# Patient Record
Sex: MALE | Race: WHITE | NOT HISPANIC OR LATINO | Employment: UNEMPLOYED | URBAN - METROPOLITAN AREA
[De-identification: names, ages, dates, MRNs, and addresses within clinical notes are randomized per-mention and may not be internally consistent; named-entity substitution may affect disease eponyms.]

---
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
## 2025-06-06 ENCOUNTER — TELEPHONE (OUTPATIENT)
Age: 20
End: 2025-06-06

## 2025-06-06 NOTE — TELEPHONE ENCOUNTER
New Patient      Insurance   Current Insurance?[employer]    Insurance E-verified? Yes    History   Reason for appointment/active symptoms?  NP Small Lump Under Testicles      Has the patient had any previous Urologist(s)? No     Was the patient seen in the ED? No     Labs/Imaging(Including Out Of Network)? No     Records Requested? Yes  Records Visible in EPIC? Yes     Personal history of cancer? No     Appointment   Office location preference:  [place]  ?   Appointment Details   Date:  6/30  Time:  1:40  Location:  [place]   Provider:  Jame  Does the appointment need further review? No

## 2025-06-25 RX ORDER — MELOXICAM 15 MG/1
15 TABLET ORAL DAILY
COMMUNITY
Start: 2025-04-09

## 2025-06-25 RX ORDER — DEXTROAMPHETAMINE SACCHARATE, AMPHETAMINE ASPARTATE, DEXTROAMPHETAMINE SULFATE AND AMPHETAMINE SULFATE 1.25; 1.25; 1.25; 1.25 MG/1; MG/1; MG/1; MG/1
5 TABLET ORAL DAILY PRN
COMMUNITY
Start: 2025-03-24

## 2025-06-30 ENCOUNTER — OFFICE VISIT (OUTPATIENT)
Dept: UROLOGY | Facility: AMBULATORY SURGERY CENTER | Age: 20
End: 2025-06-30
Payer: COMMERCIAL

## 2025-06-30 VITALS
DIASTOLIC BLOOD PRESSURE: 64 MMHG | WEIGHT: 250 LBS | HEIGHT: 74 IN | OXYGEN SATURATION: 97 % | BODY MASS INDEX: 32.08 KG/M2 | SYSTOLIC BLOOD PRESSURE: 134 MMHG | HEART RATE: 65 BPM

## 2025-06-30 DIAGNOSIS — R19.09 GROIN LUMP: Primary | ICD-10-CM

## 2025-06-30 PROCEDURE — 99204 OFFICE O/P NEW MOD 45 MIN: CPT

## 2025-06-30 RX ORDER — MULTIVIT-MIN/IRON FUM/FOLIC AC 7.5 MG-4
1 TABLET ORAL DAILY
COMMUNITY

## 2025-06-30 NOTE — ASSESSMENT & PLAN NOTE
Examination of the area in question today did reveal a soft, mobile lump within the left inner thigh region.  We discussed that my suspicion may be for a hernia.  However, differential diagnosis also includes potential lymph node, lipoma, or sebaceous cyst.  Fortunately, I reassured the patient and his mother that I do not concern for a cancerous process at this time.  Recommended obtainment of an ultrasound of the area for initial evaluation.  If the ultrasound returns inconclusive then we may need to pursue a CT scan of the pelvis/groin region for further evaluation.  Patient will follow-up as needed and our office will call with ultrasound results and based follow-up off of the results from the imaging.

## 2025-06-30 NOTE — PROGRESS NOTES
6/30/2025      Assessment and Plan    19 y.o. male new patient to [facility] for urology    Groin lump  Examination of the area in question today did reveal a soft, mobile lump within the left inner thigh region.  We discussed that my suspicion may be for a hernia.  However, differential diagnosis also includes potential lymph node, lipoma, or sebaceous cyst.  Fortunately, I reassured the patient and his mother that I do not concern for a cancerous process at this time.  Recommended obtainment of an ultrasound of the area for initial evaluation.  If the ultrasound returns inconclusive then we may need to pursue a CT scan of the pelvis/groin region for further evaluation.  Patient will follow-up as needed and our office will call with ultrasound results and based follow-up off of the results from the imaging.        History of Present Illness  Ricky Marte is a 19 y.o. male here for evaluation of groin lump.    Today, the patient presents the office with his mother.  Patient and his mother endorse that they were recently on a 2-week trip to [place] and during that time the patient was feeling within his groin region and felt a lump in his left inner thigh region.  Patient notes that he tried to apply hard pressure to the area and felt as if it may have popped at 1 point.  Patient denies any pain since realizing that it was present.  Patient notes that it may have increased in size on 1 occasion, but is not entirely sure.  Patient denies any infectious symptoms including fevers, chills, nausea, or vomiting.  Patient notes that he is physically active and a .  Otherwise, the patient offers no testicular complaints or changes in his voiding pattern.        Review of Systems   Constitutional:  Negative for chills and fever.   HENT:  Negative for ear pain and sore throat.    Eyes:  Negative for pain and visual disturbance.   Respiratory:  Negative for cough and shortness of breath.    Cardiovascular:   "Negative for chest pain and palpitations.   Gastrointestinal:  Negative for abdominal pain and vomiting.   Genitourinary:  Negative for decreased urine volume, difficulty urinating, dysuria, flank pain, frequency, hematuria and urgency.   Musculoskeletal:  Negative for arthralgias and back pain.   Skin:  Negative for color change and rash.   Neurological:  Negative for seizures and syncope.   All other systems reviewed and are negative.          AUA SYMPTOM SCORE      Flowsheet Row Most Recent Value   AUA SYMPTOM SCORE    How often have you had a sensation of not emptying your bladder completely after you finished urinating? 0 (P)     How often have you had to urinate again less than two hours after you finished urinating? 0 (P)     How often have you found you stopped and started again several times when you urinate? 0 (P)     How often have you found it difficult to postpone urination? 0 (P)     How often have you had a weak urinary stream? 0 (P)     How often have you had to push or strain to begin urination? 0 (P)     How many times did you most typically get up to urinate from the time you went to bed at night until the time you got up in the morning? 0 (P)     Quality of Life: If you were to spend the rest of your life with your urinary condition just the way it is now, how would you feel about that? 0 (P)     AUA SYMPTOM SCORE 0 (P)               Vitals  Vitals:    06/30/25 1346   BP: 134/64   BP Location: Left arm   Patient Position: Sitting   Cuff Size: Standard   Pulse: 65   SpO2: 97%   Weight: 113 kg (250 lb)   Height: 6' 2\" (1.88 m)       Physical Exam  Vitals reviewed.   Constitutional:       General: He is not in acute distress.     Appearance: Normal appearance. He is not ill-appearing.   HENT:      Head: Normocephalic and atraumatic.      Nose: Nose normal.     Eyes:      General: No scleral icterus.    Pulmonary:      Effort: No respiratory distress.   Abdominal:      General: Abdomen is flat. There " "is no distension.      Palpations: Abdomen is soft.      Tenderness: There is no abdominal tenderness.   Genitourinary:     Comments: Soft, mobile 1 to 2 cm palpable lump beneath the skin in the left inner thigh region.    Musculoskeletal:         General: Normal range of motion.      Cervical back: Normal range of motion.     Skin:     General: Skin is warm.      Coloration: Skin is not jaundiced.     Neurological:      Mental Status: He is alert and oriented to person, place, and time.      Gait: Gait normal.     Psychiatric:         Mood and Affect: Mood normal.         Behavior: Behavior normal.           Past History  Past Medical History[1]  Social History[2]  Tobacco Use History[3]  Family History[4]    The following portions of the patient's history were reviewed and updated as appropriate: allergies, current medications, past medical history, past social history, past surgical history and problem list.    Results  No results found for this or any previous visit (from the past hour).]  No results found for: \"PSA\"  Lab Results   Component Value Date    CALCIUM 9.2 09/27/2019    K 4.7 09/27/2019    CO2 27 09/27/2019     09/27/2019    BUN 11 09/27/2019    CREATININE 0.76 09/27/2019     No results found for: \"WBC\", \"HGB\", \"HCT\", \"MCV\", \"PLT\"        [1]   Past Medical History:  Diagnosis Date    ADHD (attention deficit hyperactivity disorder)    [2]   Social History  Socioeconomic History    Marital status: Single   Tobacco Use    Smoking status: Never     Passive exposure: Never    Smokeless tobacco: Former   Substance and Sexual Activity    Drug use: Yes     Types: Marijuana    Sexual activity: Not Currently     Social Drivers of Health     Financial Resource Strain: Not At Risk (3/24/2025)    Received from [facility]    Financial Insecurity     In the last 12 months did you skip medications to save money?: No     In the last 12 months was there a time when you needed to see a doctor but " could not because of cost?: No   Food Insecurity: No Food Insecurity (3/24/2025)    Received from [facility]    Food Insecurity     In the last 12 months did you ever eat less than you felt you should because there wasn't enough money for food?: No   Transportation Needs: No Transportation Needs (3/24/2025)    Received from [facility]    Transportation Needs     In the last 12 months have you ever had to go without healthcare because you didn't have a way to get there?: No   Social Connections: Socially Integrated (3/24/2025)    Received from [facility]    Social Connection     Do you often feel lonely?: No   Housing Stability: Not At Risk (3/24/2025)    Received from [facility]    Housing Stability     Are you worried that in the next 2 months you may not have stable housing?: No   [3]   Social History  Tobacco Use   Smoking Status Never    Passive exposure: Never   Smokeless Tobacco Former   [4] No family history on file.

## 2025-07-09 ENCOUNTER — HOSPITAL ENCOUNTER (OUTPATIENT)
Dept: ULTRASOUND IMAGING | Facility: MEDICAL CENTER | Age: 20
Discharge: HOME | End: 2025-07-09
Payer: COMMERCIAL

## 2025-07-09 DIAGNOSIS — R19.09 GROIN LUMP: ICD-10-CM

## 2025-07-09 PROCEDURE — 76882 US LMTD JT/FCL EVL NVASC XTR: CPT

## 2025-07-11 ENCOUNTER — PATIENT MESSAGE (OUTPATIENT)
Dept: UROLOGY | Facility: AMBULATORY SURGERY CENTER | Age: 20
End: 2025-07-11

## 2025-07-16 ENCOUNTER — RESULTS FOLLOW-UP (OUTPATIENT)
Dept: UROLOGY | Facility: AMBULATORY SURGERY CENTER | Age: 20
End: 2025-07-16

## 2025-07-16 DIAGNOSIS — R19.09 GROIN LUMP: Primary | ICD-10-CM

## 2025-07-16 DIAGNOSIS — R59.9 ENLARGED LYMPH NODE: ICD-10-CM

## 2025-07-17 NOTE — TELEPHONE ENCOUNTER
Called and left DVM going over information from note.Left brent back number if patient calls back please relay information.     ----- Message from Jame Dawkins PA-C sent at 7/16/2025  2:27 PM EDT -----  Please call the patient and advise him I reviewed his most recent ultrasound.  Ultrasound revealed a left groin lymph node with no suspicious features.  Would recommend follow-up with interventional   radiology to discuss potential biopsy.  Ambulatory referral to interventional radiology placed at this time.  ----- Message -----  From: Interface, Radiology Results In  Sent: 7/16/2025   1:27 PM EDT  To: Jame Dawkins PA-C

## 2025-07-18 NOTE — TELEPHONE ENCOUNTER
Called and spoke to patient and relayed information. Patient aware of Interventional Radiology referral and that they may be reaching out to schedule with him. Patient amenable to plan.        ----- Message from Jame Dawkins PA-C sent at 7/16/2025  2:27 PM EDT -----  Please call the patient and advise him I reviewed his most recent ultrasound.  Ultrasound revealed a left groin lymph node with no suspicious features.  Would recommend follow-up with interventional   radiology to discuss potential biopsy.  Ambulatory referral to interventional radiology placed at this time.  ----- Message -----  From: Interface, Radiology Results In  Sent: 7/16/2025   1:27 PM EDT  To: Jame Dawkins PA-C

## 2025-07-18 NOTE — TELEPHONE ENCOUNTER
"Called and spoke to mom who had questions about previous phone call states son had woken up to call and was very sleepy and was very confused what was being told on him.  . Explained I understand it was early in the morning and son may have not been attentive to whole conversation .Went over how I explained  although no suspicious features potential biopsy can be used to see what lymph node is composed of and sometimes can be used out to rule out cancer but that again lymph node \"has no suspicious features\".    She has question about Ultrasound  saying \"everyone has has lymph nodes\" and says on bottom of report it states \"Comparison image of the right medial thigh/groin shows no abnormalities\" so why is he being referred to interventional radiology for surgery before proceeding with more labs. Explained referral would be to discuss \"potential\" biopsy with interventional radiology. Patients mom wants more elaboration on Ultrasound . Please advise.        ----- Message from Marilia Gibbons sent at 7/18/2025  3:34 PM EDT -----      "

## 2025-07-18 NOTE — TELEPHONE ENCOUNTER
Pt's mom would like a cb to discuss the patient's US a bit better, the mom and the pt are a bit confused. Mom states that the term 'Cancer' was brought up in the last phone call and she isn't sure why. Kindly reach back out to the pt and mother for clarification.    Pt cb: [phone] Alain[place]

## 2025-07-23 NOTE — TELEPHONE ENCOUNTER
Called and left a detailed message relaying this message. I also left the number for urology if they have any questions. If this pt or his mother calls back please relay these messages again. Thank you              ----- Message from Jame Dawkins PA-C sent at 7/23/2025  9:19 AM EDT -----  There is no suspicious features to the left groin lymph node.  This is not a referral for surgery, but however a consultation in regards to the palpable lymph node and if there is clinical   significance that should warrant more testing or if this is normal for someone of the patient's age.  Our office does not typically deal with these findings and I placed a referral to the specialty   that deals with lymph nodes. Would reassure the patient and his mother that with no suspicious features that no further workup may be required.  ----- Message -----  From: Sierra López  Sent: 7/18/2025   4:16 PM EDT  To: Jame Dawkins PA-C    ----- Message from Sierra López sent at 7/18/2025  4:16 PM EDT -----      ----- Message -----  From: Marilia Gibbons  Sent: 7/18/2025   3:34 PM EDT  To: [facility]    ----- Message from Marilia Gibbons sent at 7/18/2025  3:34 PM EDT -----

## 2025-07-31 ENCOUNTER — TELEPHONE (OUTPATIENT)
Dept: UROLOGY | Facility: AMBULATORY SURGERY CENTER | Age: 20
End: 2025-07-31